# Patient Record
(demographics unavailable — no encounter records)

---

## 2017-01-24 NOTE — REP
LEFT ANKLE, FOUR VIEWS:

 

HISTORY:  Injury.

 

There is no acute fracture or dislocation.  The joint space is normal in

appearance.  Soft tissue swelling is present over the lateral malleolus.

 

IMPRESSION:

 

There is no acute fracture or dislocation.

 

 

Signed by

Tay Johnson MD 01/24/2017 08:47 A